# Patient Record
Sex: FEMALE | Race: BLACK OR AFRICAN AMERICAN | NOT HISPANIC OR LATINO
[De-identification: names, ages, dates, MRNs, and addresses within clinical notes are randomized per-mention and may not be internally consistent; named-entity substitution may affect disease eponyms.]

---

## 2017-07-18 ENCOUNTER — APPOINTMENT (OUTPATIENT)
Dept: OBGYN | Facility: CLINIC | Age: 37
End: 2017-07-18

## 2017-07-20 ENCOUNTER — RESULT REVIEW (OUTPATIENT)
Age: 37
End: 2017-07-20

## 2017-07-25 ENCOUNTER — OUTPATIENT (OUTPATIENT)
Dept: OUTPATIENT SERVICES | Facility: HOSPITAL | Age: 37
LOS: 1 days | End: 2017-07-25
Payer: COMMERCIAL

## 2017-07-25 VITALS
OXYGEN SATURATION: 99 % | TEMPERATURE: 98 F | HEART RATE: 76 BPM | HEIGHT: 63 IN | RESPIRATION RATE: 16 BRPM | DIASTOLIC BLOOD PRESSURE: 72 MMHG | SYSTOLIC BLOOD PRESSURE: 150 MMHG | WEIGHT: 188.05 LBS

## 2017-07-25 DIAGNOSIS — Z98.890 OTHER SPECIFIED POSTPROCEDURAL STATES: Chronic | ICD-10-CM

## 2017-07-25 DIAGNOSIS — O02.1 MISSED ABORTION: ICD-10-CM

## 2017-07-25 DIAGNOSIS — I10 ESSENTIAL (PRIMARY) HYPERTENSION: ICD-10-CM

## 2017-07-25 DIAGNOSIS — Z01.818 ENCOUNTER FOR OTHER PREPROCEDURAL EXAMINATION: ICD-10-CM

## 2017-07-25 DIAGNOSIS — I25.2 OLD MYOCARDIAL INFARCTION: Chronic | ICD-10-CM

## 2017-07-25 LAB
ANION GAP SERPL CALC-SCNC: 14 MMOL/L — SIGNIFICANT CHANGE UP (ref 5–17)
BLD GP AB SCN SERPL QL: NEGATIVE — SIGNIFICANT CHANGE UP
BUN SERPL-MCNC: 13 MG/DL — SIGNIFICANT CHANGE UP (ref 7–23)
CALCIUM SERPL-MCNC: 9.8 MG/DL — SIGNIFICANT CHANGE UP (ref 8.4–10.5)
CHLORIDE SERPL-SCNC: 102 MMOL/L — SIGNIFICANT CHANGE UP (ref 96–108)
CO2 SERPL-SCNC: 22 MMOL/L — SIGNIFICANT CHANGE UP (ref 22–31)
CREAT SERPL-MCNC: 0.88 MG/DL — SIGNIFICANT CHANGE UP (ref 0.5–1.3)
GLUCOSE SERPL-MCNC: 84 MG/DL — SIGNIFICANT CHANGE UP (ref 70–99)
HCT VFR BLD CALC: 38.3 % — SIGNIFICANT CHANGE UP (ref 34.5–45)
HGB BLD-MCNC: 12.6 G/DL — SIGNIFICANT CHANGE UP (ref 11.5–15.5)
MCHC RBC-ENTMCNC: 29.4 PG — SIGNIFICANT CHANGE UP (ref 27–34)
MCHC RBC-ENTMCNC: 32.9 GM/DL — SIGNIFICANT CHANGE UP (ref 32–36)
MCV RBC AUTO: 89.3 FL — SIGNIFICANT CHANGE UP (ref 80–100)
PLATELET # BLD AUTO: 386 K/UL — SIGNIFICANT CHANGE UP (ref 150–400)
POTASSIUM SERPL-MCNC: 4.6 MMOL/L — SIGNIFICANT CHANGE UP (ref 3.5–5.3)
POTASSIUM SERPL-SCNC: 4.6 MMOL/L — SIGNIFICANT CHANGE UP (ref 3.5–5.3)
RBC # BLD: 4.29 M/UL — SIGNIFICANT CHANGE UP (ref 3.8–5.2)
RBC # FLD: 13.2 % — SIGNIFICANT CHANGE UP (ref 10.3–14.5)
RH IG SCN BLD-IMP: POSITIVE — SIGNIFICANT CHANGE UP
SODIUM SERPL-SCNC: 138 MMOL/L — SIGNIFICANT CHANGE UP (ref 135–145)
WBC # BLD: 7.17 K/UL — SIGNIFICANT CHANGE UP (ref 3.8–10.5)
WBC # FLD AUTO: 7.17 K/UL — SIGNIFICANT CHANGE UP (ref 3.8–10.5)

## 2017-07-25 PROCEDURE — G0463: CPT

## 2017-07-25 PROCEDURE — 80048 BASIC METABOLIC PNL TOTAL CA: CPT

## 2017-07-25 PROCEDURE — 86901 BLOOD TYPING SEROLOGIC RH(D): CPT

## 2017-07-25 PROCEDURE — 86850 RBC ANTIBODY SCREEN: CPT

## 2017-07-25 PROCEDURE — 85027 COMPLETE CBC AUTOMATED: CPT

## 2017-07-25 PROCEDURE — 86900 BLOOD TYPING SEROLOGIC ABO: CPT

## 2017-07-25 RX ORDER — ACETAMINOPHEN 500 MG
975 TABLET ORAL ONCE
Qty: 0 | Refills: 0 | Status: COMPLETED | OUTPATIENT
Start: 2017-07-26 | End: 2017-07-26

## 2017-07-25 RX ORDER — CELECOXIB 200 MG/1
200 CAPSULE ORAL ONCE
Qty: 0 | Refills: 0 | Status: COMPLETED | OUTPATIENT
Start: 2017-07-26 | End: 2017-07-26

## 2017-07-25 RX ORDER — LIDOCAINE HCL 20 MG/ML
0.2 VIAL (ML) INJECTION ONCE
Qty: 0 | Refills: 0 | Status: DISCONTINUED | OUTPATIENT
Start: 2017-07-26 | End: 2017-08-10

## 2017-07-25 RX ORDER — SODIUM CHLORIDE 9 MG/ML
3 INJECTION INTRAMUSCULAR; INTRAVENOUS; SUBCUTANEOUS EVERY 8 HOURS
Qty: 0 | Refills: 0 | Status: DISCONTINUED | OUTPATIENT
Start: 2017-07-26 | End: 2017-08-10

## 2017-07-25 NOTE — H&P PST ADULT - NSANTHOSAYNRD_GEN_A_CORE
No. DEONNA screening performed.  STOP BANG Legend: 0-2 = LOW Risk; 3-4 = INTERMEDIATE Risk; 5-8 = HIGH Risk Neck 15 in./No. DEONNA screening performed.  STOP BANG Legend: 0-2 = LOW Risk; 3-4 = INTERMEDIATE Risk; 5-8 = HIGH Risk

## 2017-07-25 NOTE — H&P PST ADULT - HISTORY OF PRESENT ILLNESS
This is a 36 y/o female with PMH: HTN. Now dx: Missed  approximately 6 weeks. Scheduled: Suction Curettage for Missed .

## 2017-07-25 NOTE — H&P PST ADULT - FAMILY HISTORY
Mother  Still living? No  Family history of heart attack, Age at diagnosis: Age Unknown     Sibling  Still living? Yes, Estimated age: 21-30  Family history of sickle cell trait, Age at diagnosis: Age Unknown

## 2017-07-26 ENCOUNTER — OUTPATIENT (OUTPATIENT)
Dept: OUTPATIENT SERVICES | Facility: HOSPITAL | Age: 37
LOS: 1 days | End: 2017-07-26
Payer: COMMERCIAL

## 2017-07-26 ENCOUNTER — TRANSCRIPTION ENCOUNTER (OUTPATIENT)
Age: 37
End: 2017-07-26

## 2017-07-26 ENCOUNTER — APPOINTMENT (OUTPATIENT)
Dept: OBGYN | Facility: HOSPITAL | Age: 37
End: 2017-07-26

## 2017-07-26 ENCOUNTER — RESULT REVIEW (OUTPATIENT)
Age: 37
End: 2017-07-26

## 2017-07-26 VITALS
WEIGHT: 188.05 LBS | HEART RATE: 72 BPM | DIASTOLIC BLOOD PRESSURE: 88 MMHG | TEMPERATURE: 98 F | HEIGHT: 63 IN | SYSTOLIC BLOOD PRESSURE: 152 MMHG | OXYGEN SATURATION: 100 % | RESPIRATION RATE: 20 BRPM

## 2017-07-26 VITALS
HEART RATE: 73 BPM | TEMPERATURE: 98 F | OXYGEN SATURATION: 100 % | DIASTOLIC BLOOD PRESSURE: 79 MMHG | SYSTOLIC BLOOD PRESSURE: 136 MMHG | RESPIRATION RATE: 14 BRPM

## 2017-07-26 DIAGNOSIS — Z01.818 ENCOUNTER FOR OTHER PREPROCEDURAL EXAMINATION: ICD-10-CM

## 2017-07-26 DIAGNOSIS — O02.1 MISSED ABORTION: ICD-10-CM

## 2017-07-26 DIAGNOSIS — Z98.890 OTHER SPECIFIED POSTPROCEDURAL STATES: Chronic | ICD-10-CM

## 2017-07-26 PROCEDURE — 59820 CARE OF MISCARRIAGE: CPT

## 2017-07-26 PROCEDURE — 88305 TISSUE EXAM BY PATHOLOGIST: CPT | Mod: 26

## 2017-07-26 PROCEDURE — 88305 TISSUE EXAM BY PATHOLOGIST: CPT

## 2017-07-26 RX ORDER — ONDANSETRON 8 MG/1
4 TABLET, FILM COATED ORAL ONCE
Qty: 0 | Refills: 0 | Status: DISCONTINUED | OUTPATIENT
Start: 2017-07-26 | End: 2017-08-10

## 2017-07-26 RX ORDER — SODIUM CHLORIDE 9 MG/ML
1000 INJECTION, SOLUTION INTRAVENOUS
Qty: 0 | Refills: 0 | Status: DISCONTINUED | OUTPATIENT
Start: 2017-07-26 | End: 2017-08-10

## 2017-07-26 RX ORDER — OXYCODONE HYDROCHLORIDE 5 MG/1
10 TABLET ORAL ONCE
Qty: 0 | Refills: 0 | Status: DISCONTINUED | OUTPATIENT
Start: 2017-07-26 | End: 2017-07-26

## 2017-07-26 RX ORDER — CELECOXIB 200 MG/1
200 CAPSULE ORAL ONCE
Qty: 0 | Refills: 0 | Status: COMPLETED | OUTPATIENT
Start: 2017-07-26 | End: 2017-07-26

## 2017-07-26 RX ORDER — FAMOTIDINE 10 MG/ML
0 INJECTION INTRAVENOUS
Qty: 0 | Refills: 0 | COMMUNITY

## 2017-07-26 RX ORDER — AMLODIPINE BESYLATE 2.5 MG/1
1 TABLET ORAL
Qty: 0 | Refills: 0 | COMMUNITY

## 2017-07-26 RX ADMIN — CELECOXIB 200 MILLIGRAM(S): 200 CAPSULE ORAL at 16:30

## 2017-07-26 RX ADMIN — CELECOXIB 200 MILLIGRAM(S): 200 CAPSULE ORAL at 14:09

## 2017-07-26 RX ADMIN — Medication 975 MILLIGRAM(S): at 14:09

## 2017-07-26 NOTE — PRE-ANESTHESIA EVALUATION ADULT - NSANTHOSAYNRD_GEN_A_CORE
No. DEONNA screening performed.  STOP BANG Legend: 0-2 = LOW Risk; 3-4 = INTERMEDIATE Risk; 5-8 = HIGH Risk/Neck 15 in.

## 2017-07-26 NOTE — ASU DISCHARGE PLAN (ADULT/PEDIATRIC). - SPECIAL INSTRUCTIONS
call if soaking pads with blood every hour, fever or abdominal pain not relieved with over the counter medications

## 2017-07-26 NOTE — ASU DISCHARGE PLAN (ADULT/PEDIATRIC). - NOTIFY
Persistent Nausea and Vomiting/Pain not relieved by Medications/Inability to Tolerate Liquids or Foods/GYN Fever>100.4/Bleeding that does not stop

## 2017-07-28 LAB — SURGICAL PATHOLOGY STUDY: SIGNIFICANT CHANGE UP

## 2020-02-02 NOTE — H&P PST ADULT - HEMATOLOGY/LYMPHATICS
2/2 immune compromise, diagnosed on recent hospitalization  - c/w Valcyte 900mg BID for until 01/27/20 for 2 week course  - Recheck CMV PCR 01/21/20 2/2 immune compromise, diagnosed on recent hospitalization  - c/w Valcyte 900mg BID for until 01/27/20 for 2 week course  - Recheck CMV PCR 01/21/20 2/2 immune compromise, diagnosed on recent hospitalization  - c/w Valcyte 900mg BID for until 01/27/20 for 2 week course  - Recheck CMV PCR 01/21/20 2/2 immune compromise, diagnosed on recent hospitalization  - c/w Valcyte 900mg BID for until 01/27/20 for 2 week course  - Recheck CMV PCR today 2/2 immune compromise, diagnosed on recent hospitalization  - c/w Valcyte 900mg BID for until 01/27/20 for 2 week course. d/w ID - would repeat PCR on 1/27 to ensure continued improvement 2/2 immune compromise, diagnosed on recent hospitalization  - c/w Valcyte 900mg BID through 01/27/20 to complete 2 week course. Improved 2/2 immune compromise, diagnosed on recent hospitalization  - c/w Valcyte 900mg BID through 01/27/20 to complete 2 week course. Improved 2/2 immune compromise, diagnosed on recent hospitalization  - c/w Valcyte 900mg BID through 01/27/20 to complete 2 week course. Would repeat PCR on 1/27 (ordered) to ensure continued improvement 2/2 immune compromise, diagnosed on recent hospitalization  - c/w Valcyte 900mg BID through 01/27/20 to complete 2 week course. Would repeat PCR on 1/27 (ordered) to ensure continued improvement 2/2 immune compromise, diagnosed on recent hospitalization  - c/w Valcyte 900mg BID through 01/27/20 to complete 2 week course. Would repeat PCR on 1/27 (ordered) to ensure continued improvement 2/2 immune compromise, diagnosed on recent hospitalization  - c/w Valcyte 900mg BID through 01/27/20 to complete 2 week course. Would repeat PCR on 1/27 (ordered) to ensure continued improvement 2/2 immune compromise, diagnosed on recent hospitalization  - c/w Valcyte 900mg BID through 01/27/20 to complete 2 week course. Would repeat PCR on 1/27 (ordered) to ensure continued improvement 2/2 immune compromise, diagnosed on recent hospitalization  - c/w Valcyte 900mg BID through 01/27/20 to complete 2 week course. Would repeat PCR on 1/27 to ensure continued improvement 2/2 immune compromise, diagnosed on recent hospitalization  - c/w Valcyte 900mg BID through 01/27/20, completed 2 week course. Improved 2/2 immune compromise, diagnosed on recent hospitalization  - c/w Valcyte 900mg BID through 01/27/20, completed 2 week course. Improved 2/2 immune compromise, diagnosed on recent hospitalization  - c/w Valcyte 900mg BID through 01/27/20, completed 2 week course. Improved negative

## 2021-01-27 NOTE — ASU PREOP CHECKLIST - LATEX ALLERGY
Patient called stating that she has only two pantoprazole (PROTONIX) 40 MG tablets left.  Patient stated that she is not sure if she is to get a refill on this prescription.  If she needs and ongoing prescription she would like it sent to [facility].  Please call patient to discuss if medication is going to be long term.      no